# Patient Record
Sex: FEMALE | Race: WHITE | NOT HISPANIC OR LATINO
[De-identification: names, ages, dates, MRNs, and addresses within clinical notes are randomized per-mention and may not be internally consistent; named-entity substitution may affect disease eponyms.]

---

## 2017-05-08 PROBLEM — Z00.00 ENCOUNTER FOR PREVENTIVE HEALTH EXAMINATION: Status: ACTIVE | Noted: 2017-05-08

## 2017-05-30 ENCOUNTER — APPOINTMENT (OUTPATIENT)
Dept: UROGYNECOLOGY | Facility: CLINIC | Age: 52
End: 2017-05-30

## 2017-05-30 VITALS
SYSTOLIC BLOOD PRESSURE: 90 MMHG | DIASTOLIC BLOOD PRESSURE: 70 MMHG | BODY MASS INDEX: 20.24 KG/M2 | WEIGHT: 110 LBS | HEIGHT: 62 IN

## 2017-05-30 DIAGNOSIS — Z78.9 OTHER SPECIFIED HEALTH STATUS: ICD-10-CM

## 2017-05-30 DIAGNOSIS — K59.00 CONSTIPATION, UNSPECIFIED: ICD-10-CM

## 2017-05-30 DIAGNOSIS — Z82.49 FAMILY HISTORY OF ISCHEMIC HEART DISEASE AND OTHER DISEASES OF THE CIRCULATORY SYSTEM: ICD-10-CM

## 2017-05-30 DIAGNOSIS — F15.90 OTHER STIMULANT USE, UNSPECIFIED, UNCOMPLICATED: ICD-10-CM

## 2017-05-30 DIAGNOSIS — Z85.9 PERSONAL HISTORY OF MALIGNANT NEOPLASM, UNSPECIFIED: ICD-10-CM

## 2017-05-30 LAB
BILIRUB UR QL STRIP: NORMAL
CLARITY UR: CLEAR
COLLECTION METHOD: NORMAL
GLUCOSE UR-MCNC: NORMAL
HCG UR QL: 0.2 EU/DL
HGB UR QL STRIP.AUTO: NORMAL
KETONES UR-MCNC: NORMAL
LEUKOCYTE ESTERASE UR QL STRIP: NORMAL
NITRITE UR QL STRIP: NORMAL
PH UR STRIP: 6.5
PROT UR STRIP-MCNC: NORMAL
SP GR UR STRIP: 1

## 2017-05-30 RX ORDER — VALACYCLOVIR HYDROCHLORIDE 1 G/1
TABLET, FILM COATED ORAL
Refills: 0 | Status: ACTIVE | COMMUNITY

## 2017-06-02 ENCOUNTER — FORM ENCOUNTER (OUTPATIENT)
Age: 52
End: 2017-06-02

## 2017-06-03 ENCOUNTER — APPOINTMENT (OUTPATIENT)
Dept: ULTRASOUND IMAGING | Facility: CLINIC | Age: 52
End: 2017-06-03

## 2017-06-03 ENCOUNTER — OUTPATIENT (OUTPATIENT)
Dept: OUTPATIENT SERVICES | Facility: HOSPITAL | Age: 52
LOS: 1 days | End: 2017-06-03
Payer: COMMERCIAL

## 2017-06-03 DIAGNOSIS — R33.9 RETENTION OF URINE, UNSPECIFIED: ICD-10-CM

## 2017-06-03 PROCEDURE — 76770 US EXAM ABDO BACK WALL COMP: CPT

## 2017-06-05 ENCOUNTER — APPOINTMENT (OUTPATIENT)
Dept: UROGYNECOLOGY | Facility: CLINIC | Age: 52
End: 2017-06-05

## 2017-06-05 ENCOUNTER — MESSAGE (OUTPATIENT)
Age: 52
End: 2017-06-05

## 2017-06-05 DIAGNOSIS — R35.0 FREQUENCY OF MICTURITION: ICD-10-CM

## 2017-06-05 RX ORDER — PHENAZOPYRIDINE HYDROCHLORIDE 200 MG/1
200 TABLET ORAL
Qty: 28 | Refills: 6 | Status: ACTIVE | COMMUNITY
Start: 2017-06-05 | End: 1900-01-01

## 2017-07-19 ENCOUNTER — APPOINTMENT (OUTPATIENT)
Dept: UROGYNECOLOGY | Facility: CLINIC | Age: 52
End: 2017-07-19

## 2017-07-19 DIAGNOSIS — Z87.898 PERSONAL HISTORY OF OTHER SPECIFIED CONDITIONS: ICD-10-CM

## 2017-07-19 DIAGNOSIS — R33.9 RETENTION OF URINE, UNSPECIFIED: ICD-10-CM

## 2017-07-19 DIAGNOSIS — F52.9 UNSPECIFIED SEXUAL DYSFUNCTION NOT DUE TO A SUBSTANCE OR KNOWN PHYSIOLOGICAL CONDITION: ICD-10-CM

## 2018-06-27 ENCOUNTER — RESULT REVIEW (OUTPATIENT)
Age: 53
End: 2018-06-27

## 2019-12-05 ENCOUNTER — APPOINTMENT (OUTPATIENT)
Dept: ORTHOPEDIC SURGERY | Facility: CLINIC | Age: 54
End: 2019-12-05
Payer: COMMERCIAL

## 2019-12-05 VITALS
TEMPERATURE: 98.2 F | BODY MASS INDEX: 22.08 KG/M2 | HEART RATE: 73 BPM | DIASTOLIC BLOOD PRESSURE: 79 MMHG | SYSTOLIC BLOOD PRESSURE: 120 MMHG | HEIGHT: 62 IN | WEIGHT: 120 LBS

## 2019-12-05 DIAGNOSIS — Z80.9 FAMILY HISTORY OF MALIGNANT NEOPLASM, UNSPECIFIED: ICD-10-CM

## 2019-12-05 PROCEDURE — 20610 DRAIN/INJ JOINT/BURSA W/O US: CPT | Mod: RT

## 2019-12-05 PROCEDURE — 99203 OFFICE O/P NEW LOW 30 MIN: CPT | Mod: 25

## 2019-12-05 PROCEDURE — 73030 X-RAY EXAM OF SHOULDER: CPT | Mod: RT

## 2019-12-12 NOTE — PHYSICAL EXAM
[Normal] : Gait: normal [de-identified] : Radiographs, two views of the right shoulder, reveals calcific tendinitis. [de-identified] : Right Shoulder: \par Range of motion is not assessed secondary to a lot of pain. No motor weakness to internal rotation, external rotation or abduction in the scapular plane.  Negative crank test.  Negative O’Too’s test.  Negative Speed’s test. Negative Yergason’s test.  Negative cross arm test.  No tenderness to palpation at the AC joint. Positive Hawkin’s sign.  Positive Neer’s sign. Negative apprehension. Negative sulcus sign.  No gross neurological or vascular deficits distally.  Skin is intact.  No rashes, scars or lesions. radial and ulnar pulses. No extra-articular swelling or tenderness.

## 2019-12-12 NOTE — PROCEDURE
[de-identified] : Consent: \par At this time, I have recommended an injection to the right shoulder.  The risks and benefits of the procedure were discussed with the patient in detail.  Upon verbal consent of the patient, we proceeded with the injection as noted below.  \par \par Procedure:  \par After a sterile prep, the patient underwent an injection of 9 cc of 1% Lidocaine without epinephrine and 1 cc of Kenalog into the right shoulder.  The patient tolerated the procedure well.  There were no complications.  The patient got relief from the injection.\par

## 2019-12-12 NOTE — ADDENDUM
[FreeTextEntry1] : This note was dictated by Deanna Suarez, OTR/L, PA \par \par This note was written by Vickie Barrera on 12/10/2019 acting as a scribe for JACQUIE LOWE III, MD

## 2019-12-12 NOTE — DISCUSSION/SUMMARY
[de-identified] : At this time, due to calcific tendinitis of the right shoulder, I advised ice and anti-inflammatories. She will return to the office in two weeks.

## 2019-12-12 NOTE — HISTORY OF PRESENT ILLNESS
[8] : the ailment interference is 8/10 [6] : the ailment interference is 6/10 [9] : the ailment interference is 9/10 [2] : the ailment interference is 2/10 [7] : the ailment interference is 7/10 [de-identified] : The patient comes in today with complaints of right shoulder pain.  The patient states the onset/injury occurred on 12/2/19.  This injury is not work related or due to an automobile accident.  The patient states the pain is localized.  The patient describes the pain as shooting.  The patient notes two Aleve twice a day with food makes her symptoms better, while arm movement side-to-side, makes her symptoms worse.  The patient indicates pain is at a level of 5 on a pain scale of 0-10. [de-identified] : Aleve [] : No

## 2019-12-20 ENCOUNTER — APPOINTMENT (OUTPATIENT)
Dept: ORTHOPEDIC SURGERY | Facility: CLINIC | Age: 54
End: 2019-12-20
Payer: COMMERCIAL

## 2019-12-20 VITALS
DIASTOLIC BLOOD PRESSURE: 74 MMHG | BODY MASS INDEX: 22.08 KG/M2 | TEMPERATURE: 97.6 F | HEART RATE: 85 BPM | HEIGHT: 62 IN | WEIGHT: 120 LBS | SYSTOLIC BLOOD PRESSURE: 108 MMHG

## 2019-12-20 DIAGNOSIS — M75.31 CALCIFIC TENDINITIS OF RIGHT SHOULDER: ICD-10-CM

## 2019-12-20 PROCEDURE — 99212 OFFICE O/P EST SF 10 MIN: CPT

## 2019-12-31 NOTE — PHYSICAL EXAM
[Normal] : Gait: normal [de-identified] : Right shoulder:\par Shoulder: Range of Motion in Degrees:   	\par    	                        Claimant:          Normal:  	\par Abduction (Active)  	180  	180 degrees  	\par Abduction (Passive)  	180  	180 degrees  	\par Forward elevation (Active):  	180  	180 degrees  	\par Forward elevation (Passive):  180  	180 degrees  	\par External rotation (Active):  	45  	45 degrees  	\par External rotation (Passive):  	45  	45 degrees  	\par Internal rotation (Active):  	L-1  	L-1  	\par Internal rotation (Passive):  	L-1  	L-1  	\par \par No motor weakness to internal rotation, external rotation or abduction in the scapular plane.  Negative crank test.  Negative O’Too’s test.  Negative Speed’s test. Negative Yergason’s test.  Negative cross arm test.  No tenderness to palpation at the AC joint. Negative Hawkin’s sign.  Negative Neer’s sign.  Negative apprehension. Negative sulcus sign.  No gross neurological or vascular deficits distally.  Skin is intact.  No rashes, scars or lesions.  2+ radial and ulnar pulses. No extra-articular swelling or tenderness.  [de-identified] : Station: normal [de-identified] : Appearance: Well-developed, well-nourished female  in no acute distress.

## 2019-12-31 NOTE — DISCUSSION/SUMMARY
[de-identified] : The patient presents with resolved calcific tendinitis, right shoulder.  The patient is advised to return to the office as needed.

## 2019-12-31 NOTE — ADDENDUM
[FreeTextEntry1] : This note was written by Pushpa Sanchez on 12/31/2019 acting as scribe for Deanna Suarez, SAYRAR/POPEYE, PA.\par

## 2019-12-31 NOTE — HISTORY OF PRESENT ILLNESS
[de-identified] : Florencia comes in today with no major complaints to her right shoulder.  The patient states that she was here two weeks ago for calcific tendinitis and she is feeling excellent.

## 2020-05-29 ENCOUNTER — TRANSCRIPTION ENCOUNTER (OUTPATIENT)
Age: 55
End: 2020-05-29

## 2020-07-27 ENCOUNTER — APPOINTMENT (OUTPATIENT)
Dept: ORTHOPEDIC SURGERY | Facility: CLINIC | Age: 55
End: 2020-07-27
Payer: COMMERCIAL

## 2020-07-27 VITALS
HEIGHT: 62 IN | WEIGHT: 105 LBS | DIASTOLIC BLOOD PRESSURE: 73 MMHG | TEMPERATURE: 98.2 F | HEART RATE: 84 BPM | SYSTOLIC BLOOD PRESSURE: 111 MMHG | BODY MASS INDEX: 19.32 KG/M2

## 2020-07-27 PROCEDURE — 73030 X-RAY EXAM OF SHOULDER: CPT | Mod: RT

## 2020-07-27 PROCEDURE — 99214 OFFICE O/P EST MOD 30 MIN: CPT | Mod: 25

## 2020-07-27 PROCEDURE — 20610 DRAIN/INJ JOINT/BURSA W/O US: CPT | Mod: RT

## 2020-07-29 NOTE — HISTORY OF PRESENT ILLNESS
[de-identified] : The patient comes in today for her right shoulder.  She states she is a week or so out from wrenching her right shoulder when she was cleaning out her garage and moved her 's motorcycle.  She comes in with persistent complaints of pain to her right shoulder.  It is the same shoulder she was treated for in the past for calcific tendinitis.

## 2020-07-29 NOTE — PHYSICAL EXAM
[de-identified] : General Appearance:  Well-developed, well-nourished female in no acute distress. \par  [de-identified] : Left Shoulder: \par Range of Motion in Degrees:   	\par    	                        Claimant:          Normal:  	\par Abduction (Active)  	180  	180 degrees  	\par Abduction (Passive)  	180  	180 degrees  	\par Forward elevation (Active):  	180  	180 degrees  	\par Forward elevation (Passive):  180  	180 degrees  	\par External rotation (Active):  	45  	45 degrees  	\par External rotation (Passive):  	45  	45 degrees  	\par Internal rotation (Active):  	L-1  	L-1  	\par Internal rotation (Passive):  	L-1  	L-1  	\par \par No motor weakness to internal rotation, external rotation or abduction in the scapular plane.  Negative crank test.  Negative O’Too’s test.  Negative Speed’s test. Negative Yergason’s test.  Negative cross arm test.  No tenderness to palpation at the AC joint. Negative Hawkin’s sign.  Negative Neer’s sign.  Negative apprehension. Negative sulcus sign.  No gross neurological or vascular deficits distally.  2+ radial and ulnar pulses.  Skin is intact.  No rashes, scars or lesions.  No extra-articular swelling or tenderness. \par \par Right Shoulder:  \par Range of Motion in Degrees:	\par 	                                  Claimant:	Normal:	\par Abduction (Active)	                  180	               180 degrees	\par Abduction (Passive)	  180	               180 degrees	\par Forward elevation (Active):	  180	               180 degrees	\par Forward elevation (Passive):	  180	               180 degrees	\par External rotation (Active):	   45	               45 degrees	\par External rotation (Passive):	   45	               45 degrees	\par Internal rotation (Active):	   L-1	               L-1	\par Internal rotation (Passive):	   L-1	               L-1	\par  \par No motor weakness to internal rotation, external rotation or abduction in the scapular plane.  Negative crank test.  Negative O’Too’s test.  Negative Speed’s test. Negative Yergason’s test.  Negative cross arm test.  No tenderness to palpation at the AC joint. Positive Hawkin’s sign.  Positive Neer’s sign. Negative apprehension. Negative sulcus sign.  No gross neurological or vascular deficits distally.  Skin is intact.  No rashes, scars or lesions. 2+ radial and ulnar pulses. No extra-articular swelling or tenderness.\par   [de-identified] : Ambulating with a normal gait.  Station:  Normal.  [de-identified] : Radiographs, two views of the right shoulder, show no obvious osseous abnormalities.

## 2020-07-29 NOTE — ADDENDUM
[FreeTextEntry1] : This note was written by Mackenzie Miranda on 07/29/2020 acting as scribe for Giuseppe Thayer III, MD

## 2020-07-29 NOTE — PROCEDURE
[de-identified] : Consent: \par At this time, I have recommended an injection to the right shoulder.  The risks and benefits of the procedure were discussed with the patient in detail.  Upon verbal consent of the patient, we proceeded with the injection as noted below.  \par \par Procedure:  \par After a sterile prep, the patient underwent an injection of 9 cc of 1% Lidocaine without epinephrine and 1 cc of Kenalog into the right shoulder.  The patient tolerated the procedure well.  There were no complications.

## 2020-07-29 NOTE — DISCUSSION/SUMMARY
[de-identified] : At this time, I have recommended ice and elevation for the right shoulder impingement.  She will be reassessed in three to four weeks.

## 2020-08-19 ENCOUNTER — APPOINTMENT (OUTPATIENT)
Dept: ORTHOPEDIC SURGERY | Facility: CLINIC | Age: 55
End: 2020-08-19
Payer: COMMERCIAL

## 2020-08-19 PROCEDURE — 99213 OFFICE O/P EST LOW 20 MIN: CPT | Mod: 95

## 2020-08-24 NOTE — HISTORY OF PRESENT ILLNESS
[de-identified] : The reason for the Telehealth encounter was a follow up visit for her right shoulder.\par \par This visit was provided via Telehealth using real-time 2-way audio visual technology.  The patient, KIRBY BOSWELL, was located at home, [address] [place] [address], at the time of the visit.  The provider, Dr. Giuseppe Thayer, was located at his office at [address] [place] [address] at the time of the visit.  The patient, KIRBY BOSWELL, participated in the Telehealth encounter. Verbal consent was given on 08/19/2020 at  2:30PM by the patient, KIRBY BOSWELL, self.\par \par The patient was given an injection at her last visit for traumatic impingement of her right shoulder.  She states now she feels great.  She has no complaints.

## 2020-08-24 NOTE — ADDENDUM
[FreeTextEntry1] : This note was written by Vickie Barrera on 08/24/2020 acting as a scribe for JACQUIE LOWE III, MD

## 2020-08-24 NOTE — DISCUSSION/SUMMARY
[de-identified] : At this time, due to resolved impingement syndrome of the right shoulder, I instructed her on home therapeutic modalities.  She will follow up with me on an as needed basis.  \par \par Of note, the consultation lasted approximately 10 minutes.\par

## 2020-08-24 NOTE — PHYSICAL EXAM
[de-identified] : Physical examination was performed via [facility]. \par \par Right Shoulder: \par Range of Motion in Degrees:   	\par    	                        Claimant:          Normal:  	\par Abduction (Active)  	180  	180 degrees  	\par Abduction (Passive)  	180  	180 degrees  	\par Forward elevation (Active):  	180  	180 degrees  	\par Forward elevation (Passive):  180  	180 degrees  	\par External rotation (Active):  	45  	45 degrees  	\par External rotation (Passive):  	45  	45 degrees  	\par Internal rotation (Active):  	L-1  	L-1  	\par Internal rotation (Passive):  	L-1  	L-1  	\par \par No motor weakness to internal rotation, external rotation or abduction in the scapular plane.  Negative crank test.  Negative O’Too’s test.  Negative Speed’s test. Negative Yergason’s test.  Negative cross arm test.  No tenderness to palpation at the AC joint. Negative Hawkin’s sign.  Negative Neer’s sign.  Negative apprehension. Negative sulcus sign.  No gross neurological or vascular deficits distally.  Skin is intact.  No rashes, scars or lesions. No extra-articular swelling or tenderness. \par  [de-identified] : Ambulating with a normal gait.  Station:  Normal.  [de-identified] : Appearance:  Well-developed, well-nourished female in no acute distress.\par

## 2021-07-28 ENCOUNTER — TRANSCRIPTION ENCOUNTER (OUTPATIENT)
Age: 56
End: 2021-07-28

## 2021-09-28 NOTE — REVIEW OF SYSTEMS
Pt alert and orientedx4. Pt given two apple juices for low blood suagr   [Joint Swelling] : joint swelling [Negative] : Endocrine

## 2021-09-30 ENCOUNTER — APPOINTMENT (OUTPATIENT)
Dept: ORTHOPEDIC SURGERY | Facility: CLINIC | Age: 56
End: 2021-09-30
Payer: COMMERCIAL

## 2021-09-30 VITALS
WEIGHT: 105 LBS | HEIGHT: 62 IN | HEART RATE: 79 BPM | BODY MASS INDEX: 19.32 KG/M2 | SYSTOLIC BLOOD PRESSURE: 144 MMHG | DIASTOLIC BLOOD PRESSURE: 85 MMHG

## 2021-09-30 PROCEDURE — 99214 OFFICE O/P EST MOD 30 MIN: CPT | Mod: 25

## 2021-09-30 PROCEDURE — 73030 X-RAY EXAM OF SHOULDER: CPT | Mod: RT

## 2021-09-30 PROCEDURE — 20610 DRAIN/INJ JOINT/BURSA W/O US: CPT | Mod: RT

## 2021-10-06 NOTE — ADDENDUM
[FreeTextEntry1] : This note was written by Vickie Barrera on 10/06/2021 acting as a scribe for JACQUIE LOWE III, MD

## 2021-10-06 NOTE — DISCUSSION/SUMMARY
[de-identified] : At this time, due to impingement syndrome of the right shoulder, I recommend ice and elevation.  She will be reassessed in three to four weeks.

## 2021-10-06 NOTE — PROCEDURE
[de-identified] : Consent: \par At this time, I have recommended an injection to the right shoulder.  The risks and benefits of the procedure were discussed with the patient in detail.  Upon verbal consent of the patient, we proceeded with the injection as noted below.  \par \par Procedure:  \par After a sterile prep, the patient underwent an injection of 9 cc of 1% Lidocaine without epinephrine and 1 cc of Kenalog into the right shoulder.  The patient tolerated the procedure well.  There were no complications.  \par

## 2021-10-06 NOTE — PHYSICAL EXAM
[de-identified] : Right Shoulder:  \par Range of Motion in Degrees:	\par 	                                  Claimant:	Normal:	\par Abduction (Active)	                  180	               180 degrees	\par Abduction (Passive)	  180	               180 degrees	\par Forward elevation (Active):	  180	               180 degrees	\par Forward elevation (Passive):	  180	               180 degrees	\par External rotation (Active):	   45	               45 degrees	\par External rotation (Passive):	   45	               45 degrees	\par Internal rotation (Active):	   L-1	               L-1	\par Internal rotation (Passive):	   L-1	               L-1	\par  \par No motor weakness to internal rotation, external rotation or abduction in the scapular plane.  Negative crank test.  Negative O’Too’s test.  Negative Speed’s test. Negative Yergason’s test.  Negative cross arm test.  No tenderness to palpation at the AC joint. Positive Hawkin’s sign.  Positive Neer’s sign. Negative apprehension. Negative sulcus sign.  No gross neurological or vascular deficits distally.  Skin is intact.  No rashes, scars or lesions. 2+ radial and ulnar pulses. No extra-articular swelling or tenderness.\par \par Left Shoulder: \par Range of Motion in Degrees:   	\par    	                        Claimant:  	Normal:  	\par Abduction (Active)  	180  	180 degrees  	\par Abduction (Passive)  	180  	180 degrees  	\par Forward elevation (Active):  	180  	180 degrees  	\par Forward elevation (Passive):  180  	180 degrees  	\par External rotation (Active):  	45  	45 degrees  	\par External rotation (Passive):  	45  	45 degrees  	\par Internal rotation (Active):  	L-1  	L-1  	\par Internal rotation (Passive):  	L-1  	L-1  \par 	\par No motor weakness to internal rotation, external rotation or abduction in the scapular plane.  Negative crank test.  Negative O’Too’s test.  Negative Speed’s test. Negative Yergason’s test.  Negative cross arm test.  No tenderness to palpation at the AC joint. Negative Hawkin’s sign.  Negative Neer’s sign.  Negative apprehension. Negative sulcus sign.  No gross neurological or vascular deficits distally.  Skin is intact.  No rashes, scars or lesions. 2+ radial and ulnar pulses. No extra-articular swelling or tenderness. \par   [de-identified] : Ambulating with a slightly antalgic to antalgic gait.  Station:  Normal.  [de-identified] : Appearance:  Well-developed, well-nourished female in no acute distress.\par   [de-identified] : Radiographs, two views of the right shoulder, show no obvious osseous abnormality.

## 2021-10-06 NOTE — HISTORY OF PRESENT ILLNESS
[de-identified] : The patient comes in today with increasing complaints of pain to her right shoulder.  She hasn’t been seen in quite awhile.

## 2021-10-21 ENCOUNTER — APPOINTMENT (OUTPATIENT)
Dept: ORTHOPEDIC SURGERY | Facility: CLINIC | Age: 56
End: 2021-10-21
Payer: COMMERCIAL

## 2021-10-21 VITALS
HEIGHT: 62 IN | HEART RATE: 88 BPM | WEIGHT: 105 LBS | SYSTOLIC BLOOD PRESSURE: 121 MMHG | DIASTOLIC BLOOD PRESSURE: 77 MMHG | BODY MASS INDEX: 19.32 KG/M2

## 2021-10-21 DIAGNOSIS — S39.012A STRAIN OF MUSCLE, FASCIA AND TENDON OF LOWER BACK, INITIAL ENCOUNTER: ICD-10-CM

## 2021-10-21 PROCEDURE — 99212 OFFICE O/P EST SF 10 MIN: CPT

## 2021-10-26 PROBLEM — S39.012A BACK STRAIN, INITIAL ENCOUNTER: Status: ACTIVE | Noted: 2021-10-26

## 2021-10-26 NOTE — REASON FOR VISIT
[Follow-Up Visit] : a follow-up visit for [FreeTextEntry2] : her right shoulder and new concern for her left shoulder.

## 2021-10-26 NOTE — PHYSICAL EXAM
[Normal] : Gait: normal [de-identified] : Right Shoulder: \par Shoulder: Range of Motion in Degrees:   	\par    	                        Claimant:          Normal:  	\par Abduction (Active)  	180  	180 degrees  	\par Abduction (Passive)  	180  	180 degrees  	\par Forward elevation (Active):  	180  	180 degrees  	\par Forward elevation (Passive):  180  	180 degrees  	\par External rotation (Active):  	45  	45 degrees  	\par External rotation (Passive):  	45  	45 degrees  	\par Internal rotation (Active):  	L-1  	L-1  	\par Internal rotation (Passive):  	L-1  	L-1  	\par \par Mild spasm in deltoid.  No motor weakness to internal rotation, external rotation or abduction in the scapular plane.  Negative crank test.  Negative O’Too’s test.  Negative Speed’s test. Negative Yergason’s test.  Negative cross arm test.  No tenderness to palpation at the AC joint. Negative Hawkin’s sign.  Negative Neer’s sign.  Negative apprehension. Negative sulcus sign.  No gross neurological or vascular deficits distally.  Skin is intact.  No rashes, scars or lesions.  2+ radial and ulnar pulses.  No extra-articular swelling or tenderness. \par \par Left Shoulder: \par Shoulder: Range of Motion in Degrees:   	\par    	                        Claimant:  	Normal:  	\par Abduction (Active)  	180  	180 degrees  	\par Abduction (Passive)  	180  	180 degrees  	\par Forward elevation (Active):  	180  	180 degrees  	\par Forward elevation (Passive):  180  	180 degrees  	\par External rotation (Active):  	45  	45 degrees  	\par External rotation (Passive):  	45  	45 degrees  	\par Internal rotation (Active):  	L-1  	L-1  	\par Internal rotation (Passive):  	L-1  	L-1  	\par \par Minimally tender over the pectoralis origin in and around the clavicle.  No motor weakness to internal rotation, external rotation or abduction in the scapular plane.  Negative crank test.  Negative O’Too’s test.  Negative Speed’s test. Negative Yergason’s test.  Negative cross arm test.  No tenderness to palpation at the AC joint. Negative Hawkin’s sign.  Negative Neer’s sign.  Negative apprehension. Negative sulcus sign.  No gross neurological or vascular deficits distally.  Skin is intact.  No rashes, scars or lesions.  2+ radial and ulnar pulses.  No extra-articular swelling or tenderness.\par  [de-identified] : Station:  Normal. [de-identified] : Appearance:  Well-developed, well-nourished female in no acute distress.\par

## 2021-10-26 NOTE — DISCUSSION/SUMMARY
[de-identified] : At this time, due to mild strain left back and resolving impingement syndrome of the right shoulder, I instructed the patient on home therapeutic modalities.  She will be reassessed in two weeks.

## 2021-10-26 NOTE — ADDENDUM
[FreeTextEntry1] : This note was written by Rosalina Farmer on 10/25/2021 acting as scribe for Giuseppe Thayer III, MD

## 2021-10-26 NOTE — HISTORY OF PRESENT ILLNESS
[de-identified] : The patient comes in today stating the right shoulder is feeling fine.  She has what feels like a little tightness in the lateral deltoid.  She states occasionally she gets a little pulling on the inferior aspect of her clavicle on the left.

## 2021-11-04 ENCOUNTER — APPOINTMENT (OUTPATIENT)
Dept: ORTHOPEDIC SURGERY | Facility: CLINIC | Age: 56
End: 2021-11-04
Payer: COMMERCIAL

## 2021-11-04 DIAGNOSIS — M75.42 IMPINGEMENT SYNDROME OF RIGHT SHOULDER: ICD-10-CM

## 2021-11-04 DIAGNOSIS — M75.41 IMPINGEMENT SYNDROME OF RIGHT SHOULDER: ICD-10-CM

## 2021-11-04 PROCEDURE — 99441: CPT

## 2021-11-16 PROBLEM — M75.41 IMPINGEMENT SYNDROME OF RIGHT SHOULDER: Status: ACTIVE | Noted: 2020-07-27

## 2021-11-16 PROBLEM — M75.41 IMPINGEMENT SYNDROME OF BOTH SHOULDERS: Status: ACTIVE | Noted: 2021-10-21

## 2021-11-23 ENCOUNTER — APPOINTMENT (OUTPATIENT)
Dept: MRI IMAGING | Facility: CLINIC | Age: 56
End: 2021-11-23

## 2022-05-23 ENCOUNTER — APPOINTMENT (OUTPATIENT)
Dept: ORTHOPEDIC SURGERY | Facility: CLINIC | Age: 57
End: 2022-05-23
Payer: COMMERCIAL

## 2022-05-23 PROCEDURE — 20610 DRAIN/INJ JOINT/BURSA W/O US: CPT | Mod: LT

## 2022-05-23 PROCEDURE — 99213 OFFICE O/P EST LOW 20 MIN: CPT | Mod: 25

## 2022-05-23 PROCEDURE — 73030 X-RAY EXAM OF SHOULDER: CPT | Mod: LT

## 2022-05-24 NOTE — ADDENDUM
[FreeTextEntry1] : This note was written by Denzel Ocasio on 05/24/2022, acting as a scribe for Giuesppe Thayer III, MD

## 2022-05-24 NOTE — PHYSICAL EXAM
[Normal] : Gait: normal [de-identified] : Left Shoulder:  \par Shoulder: Range of Motion in Degrees:	\par 	                                  Claimant:	Normal:	\par Abduction (Active)	                  180	               180 degrees	\par Abduction (Passive)	  180	               180 degrees	\par Forward elevation (Active):	  180	               180 degrees	\par Forward elevation (Passive):	  180	               180 degrees	\par External rotation (Active):	   45	               45 degrees	\par External rotation (Passive):	   45	               45 degrees	\par Internal rotation (Active):	   L-1	               L-1	\par Internal rotation (Passive):	   L-1	               L-1	\par  \par No motor weakness to internal rotation, external rotation or abduction in the scapular plane.  Negative crank test.  Negative O’Too’s test.  Negative Speed’s test. Negative Yergason’s test.  Negative cross arm test.  No tenderness to palpation at the AC joint. Positive Hawkin’s sign.  Positive Neer’s sign. Negative apprehension. Negative sulcus sign.  No gross neurological or vascular deficits distally.  Skin is intact.  No rashes, scars or lesions. 2+ radial and ulnar pulses. No extra-articular swelling or tenderness.\par   [de-identified] : Appearance:  Well developed, well-nourished female in no acute distress.\par   [de-identified] : Radiographs, two views of the left shoulder taken in the office today, show no obvious osseous abnormalities.\par

## 2022-05-24 NOTE — HISTORY OF PRESENT ILLNESS
[de-identified] : The patient comes in today for her right shoulder.  She states her right shoulder is feeling fine, but she is having increasing complaints of pain, especially with overhead activity, to the left shoulder.\par

## 2022-05-24 NOTE — PROCEDURE
[de-identified] : \par Indication:\par Impingement syndrome of left shoulder\par \par Consent: \par At this time, I have recommended an injection to the left shoulder.  The risks and benefits of the procedure were discussed with the patient in detail.  Upon verbal consent of the patient, we proceeded with the injection as noted below.  \par \par Description of Procedure:  \par After a sterile prep, the patient underwent an injection of 9 cc of 1% Lidocaine without epinephrine and 1 cc of Kenalog (40 mg/mL) into the left shoulder.  The patient tolerated the procedure well.  There were no complications. \par \par : [employer]par Drug Name: Triamcinolone Acetonide Injectable Suspension USP\par NDC#: 0124-4018-79\par Lot#:   [id number]\par Expiration Date:  09/23\par \par \par

## 2022-05-24 NOTE — DISCUSSION/SUMMARY
[de-identified] : At this time, I recommended ice and elevation.  She will be reassessed in 2-3 weeks for the left shoulder impingement syndrome.\par

## 2022-06-06 ENCOUNTER — APPOINTMENT (OUTPATIENT)
Dept: ORTHOPEDIC SURGERY | Facility: CLINIC | Age: 57
End: 2022-06-06

## 2022-06-06 DIAGNOSIS — M75.42 IMPINGEMENT SYNDROME OF LEFT SHOULDER: ICD-10-CM

## 2022-06-06 PROCEDURE — 99441: CPT

## 2022-06-08 PROBLEM — M75.42 IMPINGEMENT SYNDROME OF LEFT SHOULDER: Status: ACTIVE | Noted: 2022-05-23

## 2023-04-16 ENCOUNTER — NON-APPOINTMENT (OUTPATIENT)
Age: 58
End: 2023-04-16

## 2023-05-09 ENCOUNTER — APPOINTMENT (OUTPATIENT)
Dept: OTOLARYNGOLOGY | Facility: CLINIC | Age: 58
End: 2023-05-09
Payer: COMMERCIAL

## 2023-05-09 VITALS — BODY MASS INDEX: 22.08 KG/M2 | TEMPERATURE: 97.1 F | WEIGHT: 120 LBS | HEIGHT: 62 IN

## 2023-05-09 DIAGNOSIS — J32.0 CHRONIC MAXILLARY SINUSITIS: ICD-10-CM

## 2023-05-09 PROCEDURE — 31231 NASAL ENDOSCOPY DX: CPT

## 2023-05-09 PROCEDURE — 99204 OFFICE O/P NEW MOD 45 MIN: CPT | Mod: 25

## 2023-05-09 RX ORDER — SERTRALINE 25 MG/1
TABLET, FILM COATED ORAL
Refills: 0 | Status: ACTIVE | COMMUNITY

## 2023-05-09 RX ORDER — OMEPRAZOLE 40 MG/1
40 CAPSULE, DELAYED RELEASE ORAL
Qty: 90 | Refills: 0 | Status: ACTIVE | COMMUNITY
Start: 2023-05-09 | End: 1900-01-01

## 2023-05-09 RX ORDER — METOCLOPRAMIDE HYDROCHLORIDE 5 MG/1
TABLET ORAL
Refills: 0 | Status: ACTIVE | COMMUNITY

## 2023-05-09 RX ORDER — ALENDRONATE SODIUM 70 MG/1
TABLET ORAL
Refills: 0 | Status: ACTIVE | COMMUNITY

## 2023-05-09 NOTE — HISTORY OF PRESENT ILLNESS
[de-identified] : C/o occ pnd and occ feels pressure in ears.  Problem for past month - also has cough - worse with lying down.  No SOB.  No hemoptysis or sputum.  Occ sl raspy voice.  No fevers.   No freq coffee but does eat spicy foods.  No late night snacking.  Notes occ PND

## 2023-05-09 NOTE — REVIEW OF SYSTEMS
[Post Nasal Drip] : post nasal drip [Cough] : cough [Negative] : Heme/Lymph [FreeTextEntry6] : dry cough with phlegm produces slight pain in throat

## 2023-05-09 NOTE — ASSESSMENT
[FreeTextEntry1] : Patient with c/o throat discomfort and PND - no evidence of sinusitis but does have findings of LPR.  No lesion of larynx.  Recommended reflux diet and omepraxole before breakfast.  Follow up in 2-3 mos.

## 2023-08-14 ENCOUNTER — APPOINTMENT (OUTPATIENT)
Dept: OTOLARYNGOLOGY | Facility: CLINIC | Age: 58
End: 2023-08-14
Payer: COMMERCIAL

## 2023-08-14 VITALS — WEIGHT: 120 LBS | TEMPERATURE: 97.3 F | BODY MASS INDEX: 22.08 KG/M2 | HEIGHT: 62 IN

## 2023-08-14 DIAGNOSIS — J38.4 EDEMA OF LARYNX: ICD-10-CM

## 2023-08-14 DIAGNOSIS — R49.0 DYSPHONIA: ICD-10-CM

## 2023-08-14 DIAGNOSIS — R09.82 POSTNASAL DRIP: ICD-10-CM

## 2023-08-14 PROCEDURE — 31575 DIAGNOSTIC LARYNGOSCOPY: CPT

## 2023-08-14 PROCEDURE — 99214 OFFICE O/P EST MOD 30 MIN: CPT | Mod: 25

## 2023-08-14 RX ORDER — BENZONATATE 200 MG/1
200 CAPSULE ORAL
Qty: 30 | Refills: 0 | Status: ACTIVE | COMMUNITY
Start: 2023-04-17

## 2023-08-14 RX ORDER — CLOBETASOL PROPIONATE 0.5 MG/G
0.05 OINTMENT TOPICAL
Qty: 60 | Refills: 0 | Status: ACTIVE | COMMUNITY
Start: 2023-03-10

## 2023-08-14 RX ORDER — OMEPRAZOLE 40 MG/1
40 CAPSULE, DELAYED RELEASE ORAL
Qty: 90 | Refills: 0 | Status: ACTIVE | COMMUNITY
Start: 2023-08-14 | End: 1900-01-01

## 2023-08-14 RX ORDER — MOMETASONE FUROATE 1 MG/G
0.1 CREAM TOPICAL
Qty: 45 | Refills: 0 | Status: ACTIVE | COMMUNITY
Start: 2023-03-11

## 2023-08-14 NOTE — ASSESSMENT
[FreeTextEntry1] : Patient here for follow up of LPR - markedly improved.  Minimal sx now.  No further PND or raspy voice.  Exam markedly imnproved. Recommended taper omeprazole to every other day - and if doing well after several weeks to as needed.  Recommended GI eval.  and continue reflux diet. Follow up in 4 mos and as necessary   No  laryngeal lesons seen

## 2023-08-14 NOTE — HISTORY OF PRESENT ILLNESS
[de-identified] : Here for followup of reflux - raspy voice improved and sx markedly better - no further cough or raspy voice.

## 2023-09-20 ENCOUNTER — APPOINTMENT (OUTPATIENT)
Dept: OTOLARYNGOLOGY | Facility: CLINIC | Age: 58
End: 2023-09-20

## 2023-12-11 ENCOUNTER — APPOINTMENT (OUTPATIENT)
Dept: OTOLARYNGOLOGY | Facility: CLINIC | Age: 58
End: 2023-12-11
Payer: COMMERCIAL

## 2023-12-11 VITALS — BODY MASS INDEX: 21.53 KG/M2 | WEIGHT: 117 LBS | HEIGHT: 62 IN

## 2023-12-11 DIAGNOSIS — K21.9 GASTRO-ESOPHAGEAL REFLUX DISEASE W/OUT ESOPHAGITIS: ICD-10-CM

## 2023-12-11 DIAGNOSIS — J38.7 OTHER DISEASES OF LARYNX: ICD-10-CM

## 2023-12-11 DIAGNOSIS — R07.0 PAIN IN THROAT: ICD-10-CM

## 2023-12-11 PROCEDURE — 99213 OFFICE O/P EST LOW 20 MIN: CPT | Mod: 25

## 2023-12-11 PROCEDURE — 31575 DIAGNOSTIC LARYNGOSCOPY: CPT

## 2024-12-12 ENCOUNTER — APPOINTMENT (OUTPATIENT)
Dept: ORTHOPEDIC SURGERY | Facility: CLINIC | Age: 59
End: 2024-12-12

## 2024-12-12 DIAGNOSIS — M70.62 TROCHANTERIC BURSITIS, RIGHT HIP: ICD-10-CM

## 2024-12-12 DIAGNOSIS — M70.61 TROCHANTERIC BURSITIS, RIGHT HIP: ICD-10-CM

## 2024-12-12 PROCEDURE — 20610 DRAIN/INJ JOINT/BURSA W/O US: CPT | Mod: RT

## 2024-12-12 PROCEDURE — 73502 X-RAY EXAM HIP UNI 2-3 VIEWS: CPT | Mod: RT

## 2024-12-12 PROCEDURE — 99212 OFFICE O/P EST SF 10 MIN: CPT | Mod: 25

## 2024-12-16 VITALS — HEIGHT: 62 IN

## 2024-12-16 PROBLEM — M70.61 TROCHANTERIC BURSITIS OF BOTH HIPS: Status: ACTIVE | Noted: 2024-12-12
